# Patient Record
Sex: MALE | Race: WHITE
[De-identification: names, ages, dates, MRNs, and addresses within clinical notes are randomized per-mention and may not be internally consistent; named-entity substitution may affect disease eponyms.]

---

## 2016-06-23 VITALS
DIASTOLIC BLOOD PRESSURE: 84 MMHG | SYSTOLIC BLOOD PRESSURE: 184 MMHG | SYSTOLIC BLOOD PRESSURE: 184 MMHG | SYSTOLIC BLOOD PRESSURE: 184 MMHG | DIASTOLIC BLOOD PRESSURE: 84 MMHG | DIASTOLIC BLOOD PRESSURE: 84 MMHG | DIASTOLIC BLOOD PRESSURE: 84 MMHG | SYSTOLIC BLOOD PRESSURE: 184 MMHG | DIASTOLIC BLOOD PRESSURE: 84 MMHG | SYSTOLIC BLOOD PRESSURE: 184 MMHG | DIASTOLIC BLOOD PRESSURE: 84 MMHG | SYSTOLIC BLOOD PRESSURE: 184 MMHG

## 2017-03-01 ENCOUNTER — HOSPITAL ENCOUNTER (OUTPATIENT)
Dept: HOSPITAL 61 - MRI | Age: 72
Discharge: HOME | End: 2017-03-01
Attending: FAMILY MEDICINE
Payer: MEDICARE

## 2017-03-01 DIAGNOSIS — M50.90: Primary | ICD-10-CM

## 2017-03-01 PROCEDURE — A9585 GADOBUTROL INJECTION: HCPCS

## 2017-03-01 PROCEDURE — 72156 MRI NECK SPINE W/O & W/DYE: CPT

## 2017-03-01 NOTE — RAD
PROCEDURE 

MRI cervical spine without and with contrast. 

 

HISTORY 

Increasing neck pain and right arm weakness for 3 weeks, previous neck 

fusion 

 

TECHNIQUE 

Multiplanar, multi sequential pre and post contrast MR imaging was 

performed of the cervical spine. 

Contrast: 7.5 cc Gadavist 

 

COMPARISON 

August 2, 2016 

 

FINDINGS 

There is some motion degradation. There again has been anterior cervical 

fusion at C3, C4, C5. Exam does not accurately evaluate integrity of 

hardware. Cervical cord caliber is within normal limits without 

significant focal signal abnormality or enhancement. There is no 

significant marrow edema. Cervical vertebral body stature and AP alignment

are preserved. There is no significant abnormality of the cervical 

medullary junction. There is mild cervical levoscoliosis. 

C2-C3: Spinal canal is adequate. There is again shallow posterior central 

protrusion. Neural foramina remain adequate. 

C3-C4: There is again facet hypertrophic change. Central canal is again 

minimally narrowed to 9-10 millimeters. Neural foramina are not 

significantly narrowed. 

C4-C5: Spinal canal is adequate. There are uncovertebral osteophytes. 

There is mild facet hypertrophic change. Neural foramina are not 

significantly narrowed. 

C5-C6: There is again negligible bulge. There is buckling of the 

ligamentum flavum. Central canal is borderline 10 millimeters. There is 

facet degenerative change. There is likely mild narrowing of the right 

neural foramen as seen previously. 

C6-7: There is again buckling of the ligamentum flavum and minimal 

posterior bulge. Central canal is borderline 10 millimeters. Neural 

foramina are not significantly narrowed. 

C7-T1: Spinal canal and neural foramina remain adequate. 

 

IMPRESSION 

Findings are similar comparing with the August 2016 exam. There again has 

been anterior cervical fusion C3, C4, C5. There is no new significant 

cervical spinal stenosis or neural foramina compromise, similar mild 

spinal stenosis C3-4. 

 

Electronically signed by: Armani Calvo MD (Mar 01, 2017 14:20:43)

## 2017-03-23 ENCOUNTER — HOSPITAL ENCOUNTER (OUTPATIENT)
Dept: HOSPITAL 61 - PNCL | Age: 72
Discharge: HOME | End: 2017-03-23
Attending: ANESTHESIOLOGY
Payer: MEDICARE

## 2017-03-23 DIAGNOSIS — M54.12: ICD-10-CM

## 2017-03-23 DIAGNOSIS — F32.9: ICD-10-CM

## 2017-03-23 DIAGNOSIS — M48.02: Primary | ICD-10-CM

## 2017-03-23 DIAGNOSIS — E78.5: ICD-10-CM

## 2017-03-23 DIAGNOSIS — M96.1: ICD-10-CM

## 2017-03-23 DIAGNOSIS — Z90.49: ICD-10-CM

## 2017-03-23 DIAGNOSIS — F41.9: ICD-10-CM

## 2017-03-23 DIAGNOSIS — I25.10: ICD-10-CM

## 2017-03-23 DIAGNOSIS — Z96.641: ICD-10-CM

## 2017-03-23 PROCEDURE — 62323 NJX INTERLAMINAR LMBR/SAC: CPT

## 2017-03-23 PROCEDURE — 62321 NJX INTERLAMINAR CRV/THRC: CPT

## 2017-03-23 NOTE — PAIN
DATE OF SERVICE:  03/23/2017



DIAGNOSES:

1.  Cervical radiculopathy with cervical stenosis and post-cervical laminectomy

syndrome.

2.  Lumbar radiculopathy with post-lumbar laminectomy syndrome.



HISTORY OF PRESENT ILLNESS:  The patient is a 71-year-old male who returns for

followup, last seen in 01/2016.  The patient had undergone surgery in 02/2016,

cervical anterior diskectomy and fusion.  The patient did well after this, but

still has some pain in the base of the neck and into the right shoulder and

upper extremity.  The patient reports it is worse with activity using his upper

extremities, his right arm, also into the anterior chest as well on the right

side.  Aches and hurts with rotational motion of the shoulder and neck.  It is

5-7 on a scale of 10, is a constant aching pain, probably some weakness

sensation in his right arm.  The patient did have MRI scan showing multilevel

degenerative disk disease throughout the cervical spine with severe right neural

foraminal stenosis at C3-C4, moderate bilateral neural foraminal stenosis at

C4-C5 with other less severe degenerative changes in C5-C6 and C6-C7 levels. 

The patient reports no symptoms on the left arm, but significant pain in the

right, it has been awakening her from sleep at night and has difficulty getting

dressed raising his arm up over his head on the right side, but without dropping

any items or overt loss of function in the right upper extremity.



PAST MEDICAL HISTORY:  Significant for hyperlipidemia, coronary artery disease,

depression, anxiety.



PREVIOUS SURGERY:  Include hernia repair, lumbar surgery in 1991,

cholecystectomy, right hip replacement, cardiac stent placement and anterior

cervical diskectomy in 02/2016.



CURRENT MEDICATIONS:  Include gabapentin, omeprazole, multivitamin, Celexa,

carbidopa, metoprolol, daily baby aspirin, vitamin E, simvastatin, tamsulosin,

hydrocodone and levothyroxine.



ALLERGIES:  THE PATIENT HAS NO KNOWN DRUG ALLERGIES.



FAMILY HISTORY:  Significant for hypertension.



SOCIAL HISTORY:  The patient is retired.  He is  and lives with his

spouse, does not smoke, quit drinking in 1991.



REVIEW OF SYSTEMS:  The patient's review of systems is positive for those items

mentioned in history of present illness.  All systems reviewed and otherwise

negative.  It is complete, full and well documented on the patient's chart.



PHYSICAL EXAMINATION:

VITAL SIGNS:  The patient's blood pressure is 115/44, pulse 58, respirations are

18, temperature 98.4 degrees Fahrenheit.  Height is 5 feet 6 inches, weight is

168 pounds.

GENERAL:  The patient is awake, alert, oriented, appropriate, very pleasant

demeanor.

HEENT:  Head shows normocephalic, atraumatic.  Extraocular movements are intact,

symmetrical.  Oral cavity, mucous membranes are moist and pink.  Dentition is

intact.

NECK:  Shows anterior throat supple without palpable lymphadenopathy noted. 

Swallow reflex is symmetrical.

CHEST:  Shows normal on inspection.  Breath sounds are clear to auscultation

bilaterally.

HEART:  Shows S1 and S2 clear.  No murmurs auscultated.

ABDOMEN:  Soft, nontender, nondistended.  No palpable organomegaly.  No rebound

or guarding demonstrated.

BACK:  Shows spine grossly midline.  Cervical paraspinous musculature shows some

mild-to-moderate tenderness in the inferior aspect of the cervical paraspinous

muscles, more on the right than the left, but present bilaterally, but is

symmetrical without evidence of atrophy, hypertrophy, no trigger points, and no

radiation of pain.  The patient shows good rotational motion with some minor

pain reported with extension only, but not with forward flexion, right and left.

 Lateral rotation which is performed fully past 45 degrees, closer to 90

degrees, right and left.  Upper extremities show deep tendon reflexes at 2+ in

the biceps and triceps tendons.  Motor exam is strong with  strength rated

5/5 in bicep and tricep flexion and symmetrical.  Shoulder shrug is strong and

intact without loss of strength and resistance bilaterally as is abduction of

shoulder is 90 degrees without loss of strength or resistance.  Peripheral

pulses are 2+ in radial distribution.  No peripheral edema is noted.  Upper

extremities are warm and dry to touch, equal in color and appearance.



Options were discussed with the patient and the patient's old chart was reviewed

as his current medication regimen and updated and current review of systems

updated as noted.  We will plan on a cervical epidural steroid injection today

with fluoroscopic guidance.  Risks were again discussed including, but not

limited to bleeding, infection, possibility of epidural hematoma, subsequent

neurologic compromise, dural puncture, headaches, spinal cord and/or nerve

damage, side effects of steroid medication and poor results regarding pain

control.  The patient understands and wishes to proceed.  The patient will

return to clinic in approximately 2 weeks for followup, was counseled on return

appointment, activity level and side effects to be aware of.



DIAGNOSES:  Cervical radiculopathy with cervical post-laminectomy syndrome and

cervical spinal stenosis.



PROCEDURE:  Cervical epidural steroid injection in translaminar approach at the

C6-C7 level, using C-arm fluoroscopic guidance under sterile prep and drape

using local anesthetic.  Medication injected is 120 mg Depo-Medrol plus 5 mL

preservative-free normal saline and 2 mL of Isovue contrast.



CONDITION ON DISCHARGE:  Stable.  The patient tolerated procedure well, had no

complications.

 



______________________________

KYLE HOOD MD



DR:  ANU/kostas  JOB#:  [id number] / [id number]

DD:  03/23/2017 08:51  DT:  03/23/2017 10:14

## 2017-04-14 ENCOUNTER — HOSPITAL ENCOUNTER (OUTPATIENT)
Dept: HOSPITAL 61 - PNCL | Age: 72
Discharge: HOME | End: 2017-04-14
Attending: ANESTHESIOLOGY
Payer: MEDICARE

## 2017-04-14 DIAGNOSIS — E05.90: ICD-10-CM

## 2017-04-14 DIAGNOSIS — Z96.641: ICD-10-CM

## 2017-04-14 DIAGNOSIS — F32.9: ICD-10-CM

## 2017-04-14 DIAGNOSIS — Z90.49: ICD-10-CM

## 2017-04-14 DIAGNOSIS — Z87.891: ICD-10-CM

## 2017-04-14 DIAGNOSIS — E78.00: ICD-10-CM

## 2017-04-14 DIAGNOSIS — M48.02: Primary | ICD-10-CM

## 2017-04-14 DIAGNOSIS — M96.1: ICD-10-CM

## 2017-04-14 DIAGNOSIS — J44.9: ICD-10-CM

## 2017-04-14 DIAGNOSIS — M54.12: ICD-10-CM

## 2017-04-14 DIAGNOSIS — Z72.0: ICD-10-CM

## 2017-04-14 PROCEDURE — 62321 NJX INTERLAMINAR CRV/THRC: CPT

## 2017-04-15 NOTE — PAIN
DATE OF SERVICE:  04/14/2017



PROGRESS NOTE FOR PAIN CLINIC



DIAGNOSES:

1.  Cervical radiculopathy with cervical spinal stenosis and cervical

post-laminectomy syndrome.

2.  Lumbar radiculopathy with post-lumbar laminectomy syndrome.



HISTORY OF PRESENT ILLNESS:  The patient is a 71-year-old male who returns for

followup status post cervical epidural steroid injection x 1 on 03/23/2017.  The

patient reports he did very well with this.  Initially, about 50% improvement,

now about 30% and the pain is beginning to return.  The patient reports still in

the base of the neck and shoulders, worse on the right than the left, also

causes some headache as a constant aching pain and it is tight, dull at times,

rated up to 5 on a scale of 10 when it worse and it is currently at 2 on a scale

of 10 today.  The patient reports no new motor or sensory deficits, no new

visual disturbances or other changes with these headaches and reports otherwise,

doing well.  There is still significant pain in the base of the neck, shoulders,

right greater than left as noted.



PHYSICAL EXAMINATION:

VITAL SIGNS:  Today, the patient's blood pressure is 107/66, pulse ____,

respirations 18, temperature is ____ degrees Fahrenheit, height is 5 feet 6

inches, and weight is 169 pounds.

GENERAL:  The patient is awake, alert, oriented, appropriate, very pleasant

demeanor.

HEENT:  Head shows normocephalic, atraumatic.  Extraocular movements are intact,

symmetrical.  Oral cavity, mucous membranes are moist and pink.  Dentition is

intact.

NECK:  Shows anterior throat is supple without palpable lymphadenopathy noted. 

Swallow reflex is symmetrical.

CHEST:  Shows normal on inspection.  Breath sounds are clear to auscultation

bilaterally.

HEART:  Shows S1 and S2 clear.

ABDOMEN:  Soft, nontender, nondistended.  No palpable organomegaly is noted.

BACK:  Shows spine grossly in the midline.  Cervical paraspinous musculature

shows some moderate tenderness with palpation bilaterally in the middle and

lower distribution of the cervical paraspinous musculature, but only diffusely

without radiation or trigger points.  The patient shows some limited extension

of cervical spine, but good forward flexion, right and left lateral rotation is

guarded and slow but past 45 degrees, right and left lateral.

EXTREMITIES:  The patient's upper extremities show deep tendon reflexes 2+ in

the biceps, triceps tendons.  Motor exam is strong with 5/5  strength in the

biceps and triceps flexion and equal.



PLAN:  Options were discussed with the patient and the patient's old chart was

reviewed and his current medication regimen updated.  Current review of systems

updated today as well.  We will proceed with a second cervical epidural steroid

injection today with fluoroscopic guidance.  Risks were again discussed

including, but not limited to bleeding, infection, possibility of epidural

hematoma, subsequent neurological compromise, dural puncture, headaches, spinal

cord and/or nerve damage, side effects of steroid medication and poor results

regarding pain control.  The patient understands and wishes to proceed.  The

patient will return to the clinic in approximately 2 weeks for followup.  He was

counseled to return appointment, activity level and side effects to be aware of.



DIAGNOSES:  Cervical radiculopathy with cervical spinal stenosis and

post-cervical laminectomy syndrome.



PROCEDURE:  Cervical epidural steroid injection using C-arm fluoroscopic

guidance under sterile prep and drape using local anesthetic at the C6-C7 level

in translaminar approach.



MEDICATIONS INJECTED:  A 120 mg of Depo-Medrol plus 5 mL of preservative-free

normal saline and 2 mL of Isovue for contrast.



CONDITION AT DISCHARGE:  Stable.  The patient tolerated procedure well, had no

complications.

 



______________________________

KYLE HOOD MD



DR:  ANU/kostas  JOB#:  [id number] / [id number]

DD:  04/14/2017 12:31  DT:  04/15/2017 06:30

## 2017-05-01 ENCOUNTER — HOSPITAL ENCOUNTER (OUTPATIENT)
Dept: HOSPITAL 61 - PNCL | Age: 72
Discharge: HOME | End: 2017-05-01
Attending: ANESTHESIOLOGY
Payer: MEDICARE

## 2017-05-01 DIAGNOSIS — J44.9: ICD-10-CM

## 2017-05-01 DIAGNOSIS — I25.10: ICD-10-CM

## 2017-05-01 DIAGNOSIS — Z72.89: ICD-10-CM

## 2017-05-01 DIAGNOSIS — M96.1: ICD-10-CM

## 2017-05-01 DIAGNOSIS — K21.9: ICD-10-CM

## 2017-05-01 DIAGNOSIS — Z96.641: ICD-10-CM

## 2017-05-01 DIAGNOSIS — M48.02: Primary | ICD-10-CM

## 2017-05-01 DIAGNOSIS — Z72.0: ICD-10-CM

## 2017-05-01 DIAGNOSIS — M54.12: ICD-10-CM

## 2017-05-01 DIAGNOSIS — E78.00: ICD-10-CM

## 2017-05-01 DIAGNOSIS — F32.9: ICD-10-CM

## 2017-05-01 PROCEDURE — 62321 NJX INTERLAMINAR CRV/THRC: CPT

## 2017-05-01 NOTE — PAIN
DATE OF SERVICE:  05/01/2017



PROGRESS NOTE FOR PAIN CLINIC



DIAGNOSES:

1.  Cervical radiculopathy with cervical spinal stenosis and post-cervical

laminectomy syndrome.

2.  Lumbar radiculopathy with post-lumbar laminectomy syndrome.



HISTORY OF PRESENT ILLNESS:  The patient is a 71-year-old male, who returns for

followup status post cervical epidural steroid injections x 2, last seen

04/14/2017.  The patient did very well and reports near 100% improvement after

the last injection.  The pain in the base of the neck and shoulders, was feeling

much better, increase his activity with greater ease and comfort and sleeping

better at night.  No difficulty with the pain with sleeping.  The patient

reports it is worst as a 4 on a scale of 10, describes as dull achy,

occasionally constant, but much better after the last injection.  The patient

reports no new motor or sensory deficits, no new bowel or bladder incontinence

or other complaints.



PHYSICAL EXAMINATION:

VITAL SIGNS:  The patient's blood pressure 109/72, pulse 63, respirations are

16, temperature 98.0 degrees Fahrenheit.  Height is 5 feet 6 inches, weight is

165 pounds.

GENERAL:  The patient is awake, alert, oriented, appropriate, very pleasant

demeanor.

HEENT:  Head shows normocephalic, atraumatic.  Extraocular movements are intact

and symmetrical.  Oral cavity shows mucous membranes are moist and pink. 

Dentition is intact.

NECK:  Shows anterior throat supple without palpable lymphadenopathy noted. 

Swallow reflex is symmetrical.

CHEST:  Shows normal on inspection.  Breath sounds clear to auscultation

bilaterally.

HEART:  Shows S1 and S2 clear.

ABDOMEN:  Soft, nontender, nondistended.  No palpable organomegaly.  No rebound

or guarding demonstrated.

BACK:  Shows spine grossly midline.  Well-healed surgical scar is noted in the

lumbar distribution.  Cervical lordotic curvature is mildly flattened had on

previous exam with palpation shows some moderate tenderness with palpation

bilaterally in the cervical paraspinous musculature, mainly in the middle and

lower distribution, also superior medial and lateral trapezius some extent, but

without radiation.  The patient shows good rotational motion, mildly limited

secondary to limitation of motion, but not secondary to pain with extension,

also with right and left lateral rotation, but patient ____ 45 degrees

bilaterally.

EXTREMITIES:  The patient's upper extremities show deep tendon reflexes 2+ in

the biceps and triceps tendons.  Motor exam is strong with 5/5  strength,

biceps and triceps flexion.



Options were discussed with the patient.  The patient's old chart was reviewed

as his current medication regimen updated.  Current review of systems updated

today as well.  We will proceed with a third cervical epidural steroid injection

today with fluoroscopic guidance.  Risks were again discussed including, but not

limited to bleeding, infection, possibility of epidural hematoma, subsequent

neurologic compromise, dural puncture, headaches, spinal cord and/or nerve

damage, side effects of steroid medication and poor results regarding pain

control.  The patient understands and wishes to proceed.  The patient will

return to clinic in approximately 2 weeks for followup, was counseled on return

appointment, activity level and side effects to be aware of.

 

DIAGNOSES:  Cervical radiculopathy with cervical spinal stenosis, cervical

post-laminectomy syndrome.



PROCEDURE:  Cervical epidural steroid injection in translaminar approach to the

C6-C7 level using C-arm fluoroscopic guidance under sterile prep and drape using

local anesthetic.



MEDICATIONS INJECTED:  Depo-Medrol 120 mg plus 5 mL of preservative-free normal

saline and 2 mL of Isovue for contrast.



CONDITION AT DISCHARGE:  Stable.  The patient tolerated procedure well, had no

complications.

 



______________________________

KYLE HOOD MD



DR:  ANU/kostas  JOB#:  [id number] / [id number]

DD:  05/01/2017 09:55  DT:  05/01/2017 19:35

## 2017-07-12 ENCOUNTER — HOSPITAL ENCOUNTER (OUTPATIENT)
Dept: HOSPITAL 61 - US | Age: 72
Discharge: HOME | End: 2017-07-12
Attending: INTERNAL MEDICINE
Payer: MEDICARE

## 2017-07-12 DIAGNOSIS — I65.21: ICD-10-CM

## 2017-07-12 DIAGNOSIS — I73.9: Primary | ICD-10-CM

## 2017-07-12 PROCEDURE — 93880 EXTRACRANIAL BILAT STUDY: CPT

## 2017-07-19 NOTE — RAD
--------------- APPROVED REPORT --------------





Patient Location: OUT-PATIENT

Laterality:Bilateral



Indications

pvd



Doppler Spectral Velocity Analysis

Right   Left    

pCCA     69/15 cm/spCCA     98/23 cm/s

mCCA     93/19 cm/smCCA     114/31 cm/s

dCCA     71/18 cm/sdCCA     121/29 cm/s

Bulb     32/19 cm/sBulb     41/ cm/s

ECA      75/ cm/sECA      125/ cm/s

pICA     54/17 cm/spICA     69/19 cm/s

Alvaro     70/26 cm/smICA     99/27 cm/s

dICA     69/28 cm/sdICA     79/26 cm/s

Vert.    56/ cm/sVert.    41/ cm/s

Subcl.   210/ cm/sSubcl.   100/ cm/s

ICA/CCA  0.75ICA/CCA  1.01



Findings

The bilateral carotid arterial systems were assess for stenosis. 



On the right there is mild intimal hyperplasia with mild plaque burden noted throughout the arterial 
tree. The specrtral waveforms and color doppler assessment is consistent with less than 50% stenosis.
 The vertebral velocities are antegrade. The subclavian velocity is suggestive of > 50% stenosis. 



On the left there is mild intimal hyperplasia with mild plaque burden noted throughout the arterial t
ree. The specrtral waveforms and color doppler assessment is consistent with less than 50% stenosis. 
The vertebral velocities are antegrade. The subclavian velocity is within normal limits. 



Critical Notification

Critical Value: No



<Conclusion>

1. No high grade stenosis in the bilateral carotid arterial systems. 

2. Incidental finding of 50% or greater stenosis involving the right subclavian artery.

## 2017-09-06 ENCOUNTER — HOSPITAL ENCOUNTER (OUTPATIENT)
Dept: HOSPITAL 61 - SURGPAT | Age: 72
Discharge: HOME | End: 2017-09-06
Attending: NEUROLOGICAL SURGERY
Payer: MEDICARE

## 2017-09-06 DIAGNOSIS — M54.12: Primary | ICD-10-CM

## 2017-09-06 LAB
ALBUMIN SERPL-MCNC: 3.5 G/DL (ref 3.4–5)
ALBUMIN/GLOB SERPL: 1.2 {RATIO} (ref 1–1.7)
ALP SERPL-CCNC: 80 U/L (ref 46–116)
ALT SERPL-CCNC: 27 U/L (ref 16–63)
ANION GAP SERPL CALC-SCNC: 8 MMOL/L (ref 6–14)
APTT BLD: 29 SEC (ref 24–38)
AST SERPL-CCNC: 22 U/L (ref 15–37)
BASOPHILS # BLD AUTO: 0.1 X10^3/UL (ref 0–0.2)
BASOPHILS NFR BLD: 1 % (ref 0–3)
BILIRUB SERPL-MCNC: 0.3 MG/DL (ref 0.2–1)
BUN SERPL-MCNC: 16 MG/DL (ref 8–26)
BUN/CREAT SERPL: 18 (ref 6–20)
CALCIUM SERPL-MCNC: 8.5 MG/DL (ref 8.5–10.1)
CHLORIDE SERPL-SCNC: 106 MMOL/L (ref 98–107)
CO2 SERPL-SCNC: 27 MMOL/L (ref 21–32)
CREAT SERPL-MCNC: 0.9 MG/DL (ref 0.7–1.3)
EOSINOPHIL NFR BLD: 5 % (ref 0–3)
ERYTHROCYTE [DISTWIDTH] IN BLOOD BY AUTOMATED COUNT: 12.9 % (ref 11.5–14.5)
GFR SERPLBLD BASED ON 1.73 SQ M-ARVRAT: 82.7 ML/MIN
GLOBULIN SER-MCNC: 3 G/DL (ref 2.2–3.8)
GLUCOSE SERPL-MCNC: 126 MG/DL (ref 70–99)
HCT VFR BLD CALC: 43.2 % (ref 39–53)
HGB BLD-MCNC: 14.5 G/DL (ref 13–17.5)
INR PPP: 1 (ref 0.8–1.1)
LYMPHOCYTES # BLD: 1.2 X10^3/UL (ref 1–4.8)
LYMPHOCYTES NFR BLD AUTO: 19 % (ref 24–48)
MCH RBC QN AUTO: 32 PG (ref 25–35)
MCHC RBC AUTO-ENTMCNC: 34 G/DL (ref 31–37)
MCV RBC AUTO: 94 FL (ref 79–100)
MONOCYTES NFR BLD: 12 % (ref 0–9)
NEUTROPHILS NFR BLD AUTO: 63 % (ref 31–73)
PLATELET # BLD AUTO: 162 X10^3/UL (ref 140–400)
POTASSIUM SERPL-SCNC: 3.9 MMOL/L (ref 3.5–5.1)
PROT SERPL-MCNC: 6.5 G/DL (ref 6.4–8.2)
PROTHROMBIN TIME: 13 SEC (ref 11.7–14)
RBC # BLD AUTO: 4.58 X10^6/UL (ref 4.3–5.7)
SODIUM SERPL-SCNC: 141 MMOL/L (ref 136–145)
WBC # BLD AUTO: 6.6 X10^3/UL (ref 4–11)

## 2017-09-06 PROCEDURE — 87641 MR-STAPH DNA AMP PROBE: CPT

## 2017-09-06 PROCEDURE — 85025 COMPLETE CBC W/AUTO DIFF WBC: CPT

## 2017-09-06 PROCEDURE — 36415 COLL VENOUS BLD VENIPUNCTURE: CPT

## 2017-09-06 PROCEDURE — 80053 COMPREHEN METABOLIC PANEL: CPT

## 2017-09-06 PROCEDURE — 85730 THROMBOPLASTIN TIME PARTIAL: CPT

## 2017-09-06 PROCEDURE — 85610 PROTHROMBIN TIME: CPT

## 2017-12-29 ENCOUNTER — HOSPITAL ENCOUNTER (OUTPATIENT)
Dept: HOSPITAL 61 - RAD | Age: 72
Discharge: HOME | End: 2017-12-29
Attending: NEUROLOGICAL SURGERY
Payer: MEDICARE

## 2017-12-29 DIAGNOSIS — Z98.890: ICD-10-CM

## 2017-12-29 DIAGNOSIS — M43.22: Primary | ICD-10-CM

## 2017-12-29 PROCEDURE — 72040 X-RAY EXAM NECK SPINE 2-3 VW: CPT

## 2018-01-25 ENCOUNTER — HOSPITAL ENCOUNTER (OUTPATIENT)
Dept: HOSPITAL 61 - KCIC MRI | Age: 73
Discharge: HOME | End: 2018-01-25
Attending: ORTHOPAEDIC SURGERY
Payer: MEDICARE

## 2018-01-25 DIAGNOSIS — M75.102: Primary | ICD-10-CM

## 2018-01-25 PROCEDURE — 73221 MRI JOINT UPR EXTREM W/O DYE: CPT

## 2018-05-21 ENCOUNTER — HOSPITAL ENCOUNTER (OUTPATIENT)
Dept: HOSPITAL 61 - CCL | Age: 73
Discharge: HOME | End: 2018-05-21
Attending: INTERNAL MEDICINE
Payer: MEDICARE

## 2018-05-21 VITALS
SYSTOLIC BLOOD PRESSURE: 152 MMHG | SYSTOLIC BLOOD PRESSURE: 152 MMHG | DIASTOLIC BLOOD PRESSURE: 67 MMHG | SYSTOLIC BLOOD PRESSURE: 152 MMHG | DIASTOLIC BLOOD PRESSURE: 67 MMHG | DIASTOLIC BLOOD PRESSURE: 67 MMHG | SYSTOLIC BLOOD PRESSURE: 152 MMHG | DIASTOLIC BLOOD PRESSURE: 67 MMHG | DIASTOLIC BLOOD PRESSURE: 67 MMHG | SYSTOLIC BLOOD PRESSURE: 152 MMHG

## 2018-05-21 DIAGNOSIS — Z87.891: ICD-10-CM

## 2018-05-21 DIAGNOSIS — Z90.49: ICD-10-CM

## 2018-05-21 DIAGNOSIS — I36.1: ICD-10-CM

## 2018-05-21 DIAGNOSIS — E03.9: ICD-10-CM

## 2018-05-21 DIAGNOSIS — F03.90: ICD-10-CM

## 2018-05-21 DIAGNOSIS — Z95.5: ICD-10-CM

## 2018-05-21 DIAGNOSIS — Z82.49: ICD-10-CM

## 2018-05-21 DIAGNOSIS — G20: ICD-10-CM

## 2018-05-21 DIAGNOSIS — E78.00: ICD-10-CM

## 2018-05-21 DIAGNOSIS — K21.9: ICD-10-CM

## 2018-05-21 DIAGNOSIS — R55: Primary | ICD-10-CM

## 2018-05-21 DIAGNOSIS — M48.02: ICD-10-CM

## 2018-05-21 DIAGNOSIS — Z86.73: ICD-10-CM

## 2018-05-21 DIAGNOSIS — J44.9: ICD-10-CM

## 2018-05-21 DIAGNOSIS — Z72.89: ICD-10-CM

## 2018-05-21 DIAGNOSIS — I25.10: ICD-10-CM

## 2018-05-21 DIAGNOSIS — M16.11: ICD-10-CM

## 2018-05-21 DIAGNOSIS — G47.33: ICD-10-CM

## 2018-05-21 DIAGNOSIS — M54.12: ICD-10-CM

## 2018-05-21 DIAGNOSIS — N40.0: ICD-10-CM

## 2018-05-21 DIAGNOSIS — Z98.890: ICD-10-CM

## 2018-05-21 DIAGNOSIS — Z96.641: ICD-10-CM

## 2018-05-21 LAB
ANION GAP SERPL CALC-SCNC: 8 MMOL/L (ref 6–14)
BLOOD UREA NITROGEN: 16 MG/DL (ref 8–26)
CALCIUM: 8.8 MG/DL (ref 8.5–10.1)
CHLORIDE: 105 MMOL/L (ref 98–107)
CO2 SERPL-SCNC: 28 MMOL/L (ref 21–32)
CREAT SERPL-MCNC: 0.9 MG/DL (ref 0.7–1.3)
GFR SERPLBLD BASED ON 1.73 SQ M-ARVRAT: 82.7 ML/MIN
GLUCOSE SERPL-MCNC: 93 MG/DL (ref 70–99)
HCG SERPL-ACNC: 7 X10^3/UL (ref 4–11)
HEMATOCRIT: 44.4 % (ref 39–53)
HEMOGLOBIN: 15.4 G/DL (ref 13–17.5)
INR: 1 (ref 0.8–1.1)
MEAN CORPUSCULAR HEMOGLOBIN: 32 PG (ref 25–35)
MEAN CORPUSCULAR HGB CONC: 35 G/DL (ref 31–37)
MEAN CORPUSCULAR VOLUME: 92 FL (ref 79–100)
PARTIAL THROMBOPLASTIN TIME: 28 SEC (ref 24–38)
PLATELET COUNT: 186 X10^3/UL (ref 140–400)
POTASSIUM SERPL-SCNC: 3.9 MMOL/L (ref 3.5–5.1)
PROTHROMBIN TIME PATIENT: 12.3 SEC (ref 11.7–14)
RED BLOOD COUNT: 4.85 X10^6/UL (ref 4.3–5.7)
RED CELL DISTRIBUTION WIDTH: 13.4 % (ref 11.5–14.5)
SODIUM: 141 MMOL/L (ref 136–145)

## 2018-05-21 PROCEDURE — 33282: CPT

## 2018-05-21 PROCEDURE — 85730 THROMBOPLASTIN TIME PARTIAL: CPT

## 2018-05-21 PROCEDURE — 36415 COLL VENOUS BLD VENIPUNCTURE: CPT

## 2018-05-21 PROCEDURE — 85610 PROTHROMBIN TIME: CPT

## 2018-05-21 PROCEDURE — 93306 TTE W/DOPPLER COMPLETE: CPT

## 2018-05-21 PROCEDURE — 85027 COMPLETE CBC AUTOMATED: CPT

## 2018-05-21 PROCEDURE — 80048 BASIC METABOLIC PNL TOTAL CA: CPT

## 2018-05-21 RX ADMIN — LIDOCAINE HYDROCHLORIDE,EPINEPHRINE BITARTRATE 1 ML: 20; .01 INJECTION, SOLUTION INFILTRATION; PERINEURAL at 08:30

## 2018-08-06 ENCOUNTER — HOSPITAL ENCOUNTER (OUTPATIENT)
Dept: HOSPITAL 61 - NM | Age: 73
Discharge: HOME | End: 2018-08-06
Attending: INTERNAL MEDICINE
Payer: MEDICARE

## 2018-08-06 DIAGNOSIS — K21.9: ICD-10-CM

## 2018-08-06 DIAGNOSIS — Z90.49: ICD-10-CM

## 2018-08-06 DIAGNOSIS — J44.9: ICD-10-CM

## 2018-08-06 DIAGNOSIS — Z82.49: ICD-10-CM

## 2018-08-06 DIAGNOSIS — I25.10: Primary | ICD-10-CM

## 2018-08-06 DIAGNOSIS — Z95.5: ICD-10-CM

## 2018-08-06 DIAGNOSIS — I10: ICD-10-CM

## 2018-08-06 DIAGNOSIS — I25.2: ICD-10-CM

## 2018-08-06 DIAGNOSIS — E87.6: ICD-10-CM

## 2018-08-06 DIAGNOSIS — F32.9: ICD-10-CM

## 2018-08-06 DIAGNOSIS — E78.00: ICD-10-CM

## 2018-08-06 DIAGNOSIS — E78.5: ICD-10-CM

## 2018-08-06 DIAGNOSIS — M16.11: ICD-10-CM

## 2018-08-06 DIAGNOSIS — E03.9: ICD-10-CM

## 2018-08-06 LAB
CHOLESTEROL/HDL RATIO: 3.8
CHOLESTEROL: 175 MG/DL (ref 0–200)
HDLC: 46 MG/DL (ref 40–60)
LDLC: 97 MG/DL (ref 0–100)
NON-HDL CHOLESTEROL: 129 MG/DL (ref 0–129)
TRIGLYCERIDES: 161 MG/DL (ref 0–150)
VLDLC: 32 MG/DL (ref 0–40)

## 2018-08-06 PROCEDURE — 96375 TX/PRO/DX INJ NEW DRUG ADDON: CPT

## 2018-08-06 PROCEDURE — 93017 CV STRESS TEST TRACING ONLY: CPT

## 2018-08-06 PROCEDURE — 36415 COLL VENOUS BLD VENIPUNCTURE: CPT

## 2018-08-06 PROCEDURE — 96376 TX/PRO/DX INJ SAME DRUG ADON: CPT

## 2018-08-06 PROCEDURE — 78452 HT MUSCLE IMAGE SPECT MULT: CPT

## 2018-08-06 PROCEDURE — 96374 THER/PROPH/DIAG INJ IV PUSH: CPT

## 2018-08-06 PROCEDURE — 80061 LIPID PANEL: CPT

## 2018-08-06 RX ADMIN — REGADENOSON 1 MG: 0.08 INJECTION, SOLUTION INTRAVENOUS at 10:25

## 2019-02-04 ENCOUNTER — HOSPITAL ENCOUNTER (OUTPATIENT)
Dept: HOSPITAL 61 - RAD | Age: 74
Discharge: HOME | End: 2019-02-04
Attending: INTERNAL MEDICINE
Payer: MEDICARE

## 2019-02-04 DIAGNOSIS — R05: Primary | ICD-10-CM

## 2019-02-04 PROCEDURE — 71046 X-RAY EXAM CHEST 2 VIEWS: CPT

## 2019-02-04 NOTE — RAD
Chest, PA and Lateral:

 

Technique:  PA and lateral views of the chest were obtained.

 

History:  Medical workup.

 

Comparison: 4/27/2018.

 

Findings:

 

 The heart and pulmonary vasculature appear within normal limits. The 

lungs are clear.. The pleural margins are clear.  Heart monitor device 

projects in the left chest wall region.

 

Impression:

 

No acute chest process is seen. 

 

Electronically signed by: Dipak Prado MD (2/4/2019 11:11 AM) [facility]

## 2019-02-06 ENCOUNTER — HOSPITAL ENCOUNTER (OUTPATIENT)
Dept: HOSPITAL 61 - KCIC MRI | Age: 74
Discharge: HOME | End: 2019-02-06
Attending: ORTHOPAEDIC SURGERY
Payer: MEDICARE

## 2019-02-06 DIAGNOSIS — M62.511: ICD-10-CM

## 2019-02-06 DIAGNOSIS — M75.101: Primary | ICD-10-CM

## 2019-02-06 DIAGNOSIS — Z87.891: ICD-10-CM

## 2019-02-06 PROCEDURE — 73221 MRI JOINT UPR EXTREM W/O DYE: CPT

## 2019-02-06 NOTE — [facility]
MR of the right shoulder

 

HISTORY: Right shoulder pain chronically. Decreased range of motion.

 

TECHNIQUE: Routine multiplanar sequences are obtained.

 

FINDINGS:

The acromioclavicular joint is mildly degenerative.

 

Full-thickness tear of the anterior supraspinatus tendon measures 1 cm AP 

diameter. Retraction measures 2 cm of the undersurface fibers. There is 

generalized tendinosis. Undersurface tearing through the more posterior 

tendon. Partial tearing of the subscapularis tendon. Mild-to-moderate 

muscle atrophy. Mild fluid enters subdeltoid bursa.

 

No acute articular cartilage defect. There is limited labral evaluation 

due to some motion degradation but there is evidence of a tear of the 

posterosuperior labrum. Biceps tendinosis.

 

No bone lesion or acute fracture. No aggressive bone destruction.

 

No acute soft tissue abnormality.

 

IMPRESSION:

1. Full-thickness rotator cuff tear of the anterior supraspinatus tendon, 

with more generalized partial rotator cuff tearing.

2. Biceps tendinosis.

3. Small posterosuperior labral tear.

 

Electronically signed by: Jung Teague MD (2/6/2019 1:11 PM) [facility]

## 2019-02-15 ENCOUNTER — HOSPITAL ENCOUNTER (OUTPATIENT)
Dept: HOSPITAL 61 - KCIC CT | Age: 74
Discharge: HOME | End: 2019-02-15
Attending: INTERNAL MEDICINE
Payer: MEDICARE

## 2019-02-15 DIAGNOSIS — I70.0: ICD-10-CM

## 2019-02-15 DIAGNOSIS — Z87.891: ICD-10-CM

## 2019-02-15 DIAGNOSIS — J44.9: ICD-10-CM

## 2019-02-15 DIAGNOSIS — N28.1: ICD-10-CM

## 2019-02-15 DIAGNOSIS — Z90.49: ICD-10-CM

## 2019-02-15 DIAGNOSIS — I11.9: ICD-10-CM

## 2019-02-15 DIAGNOSIS — I25.10: Primary | ICD-10-CM

## 2019-02-15 PROCEDURE — 71250 CT THORAX DX C-: CPT

## 2019-02-15 NOTE — [facility]
PQRS Compliance statement:

 

One or more of the following individualized dose reduction techniques were

utilized for this examination:

1. Automated exposure control.

2. Adjustment of the mA and/or kV according to patient size.

3. Use of iterative reconstruction technique.

 

Indication:Cough, shortness of breath, intermittent wheezing.

 

TECHNIQUE: CT chest none IV contrast with multiplanar reformats.

 

COMPARISON:None

 

FINDINGS:

Heart is normal in size. No pericardial or pleural effusion. Coronary 

artery calcifications. Diffuse atherosclerotic disease of the thoracic 

aorta and upper abdominal aorta. No enlarged axillary or mediastinal 

adenopathy. Evaluation of hilar lymphadenopathy is limited due to lack of 

IV contrast. Most likely dependent pacemaker in the left chest wall. 

Central airways are patent. No emphysema. Focal calcified pleural plaque 

in the posterior left lower lobe. Noncontrast appearance of the visualized

liver, spleen, adrenals, pancreas within normal limits. Simple cyst is 

seen in the left kidney measuring 10.0 x 8.0 cm. Status post 

cholecystectomy. No suspicious bony lesion.

 

IMPRESSION:

1. No acute pulmonary findings. No emphysema.

 

Electronically signed by: Seymour Bolanos DO (2/15/2019 9:37 AM) [id number]

## 2019-06-25 ENCOUNTER — HOSPITAL ENCOUNTER (OUTPATIENT)
Dept: HOSPITAL 61 - MRI | Age: 74
Discharge: HOME | End: 2019-06-25
Attending: FAMILY MEDICINE
Payer: MEDICARE

## 2019-06-25 DIAGNOSIS — M48.02: ICD-10-CM

## 2019-06-25 DIAGNOSIS — Z98.1: ICD-10-CM

## 2019-06-25 DIAGNOSIS — M50.23: ICD-10-CM

## 2019-06-25 DIAGNOSIS — M47.813: Primary | ICD-10-CM

## 2019-06-25 DIAGNOSIS — M43.8X2: ICD-10-CM

## 2019-06-25 PROCEDURE — 72141 MRI NECK SPINE W/O DYE: CPT

## 2019-06-25 NOTE — RAD
MRI of the cervical spine without contrast 6/25/2019

 

CLINICAL HISTORY: Neck pain with right arm weakness.

 

TECHNIQUE: Unenhanced T1-weighted, T2-weighted and inversion recovery 

sagittal and gradient echo and T2-weighted axial images of the cervical 

spine were obtained.

 

FINDINGS: Mild lateral curvature of the cervical spine is seen convex to 

the left. There is straightening of the normal cervical lordosis. The 

patient is post anterior fusion using what appears to be an anterior 

plate, bone screws and bone graft material at C3-4 and C4-5. Degenerative 

signal changes are seen involving the remaining discs of the cervical 

spine. Degenerative signal changes are seen within the marrow surrounding 

these discs. No area of abnormal signal intensity is seen involving the 

cervical spinal cord.

 

At the C2-3 disc space there is a mild generalized disc bulge. 

Superimposed on this disc bulge is a focal central disc protrusion. This 

measures 3 mm in AP diameter. Degenerative changes are seen involving the 

uncovertebral and facet joints bilaterally. These findings efface the 

anterior and posterior CSF without resulting in significant central spinal

canal or neural foraminal stenosis.

 

At the C3-4 level degenerative changes are seen involving the 

uncovertebral and facet joints, right greater than left. These findings do

not result in significant central spinal canal or neural foraminal 

stenosis.

 

At the C4-5 level degenerative changes are seen involving the 

uncovertebral and facet joints, right greater than left. These findings do

not result in significant central spinal canal stenosis. Mild right neural

foraminal stenosis is seen. The left neural foramen is patent.

 

At the C5-6 disc space there is a mild generalized disc bulge. 

Degenerative changes are seen involving the uncovertebral and facet 

joints, right greater than left. These findings do not result in 

significant central spinal canal stenosis. Mild right neural foraminal 

stenosis is seen. The left neural foramen is patent.

 

At the C6-7 disc space there is a mild generalized disc bulge. 

Degenerative changes are seen involving the uncovertebral and facet joints

bilaterally. These findings efface the anterior and posterior CSF 

resulting in mild central spinal canal stenosis without evidence of cord 

impingement. Mild bilateral neural foraminal stenosis is seen.

 

At the C7-T1 disc space there is a mild generalized disc bulge. 

Degenerative changes are seen involving the facet joints bilaterally. 

These findings do not result in significant central spinal canal or neural

foraminal stenosis.

 

IMPRESSION:

1. Post anterior fusion at C3-4 and C4-5.

2. Degenerative changes are seen throughout the cervical spine. These 

findings result in mild central spinal canal stenosis without evidence of 

cord impingement at C6-7. Mild right neural foraminal stenosis is seen at 

C4-5 and C5-6. Mild bilateral neural foraminal stenosis is seen at C6-7.

 

Electronically signed by: Robert Bang MD (6/25/2019 12:01 PM) [facility]

## 2019-08-07 ENCOUNTER — HOSPITAL ENCOUNTER (OUTPATIENT)
Dept: HOSPITAL 61 - US | Age: 74
Discharge: HOME | End: 2019-08-07
Attending: INTERNAL MEDICINE
Payer: MEDICARE

## 2019-08-07 DIAGNOSIS — I10: ICD-10-CM

## 2019-08-07 DIAGNOSIS — I65.23: Primary | ICD-10-CM

## 2019-08-07 DIAGNOSIS — I25.10: ICD-10-CM

## 2019-08-07 DIAGNOSIS — I73.9: ICD-10-CM

## 2019-08-07 PROCEDURE — 93880 EXTRACRANIAL BILAT STUDY: CPT

## 2019-08-07 NOTE — RAD
MR#: [id number]

Account#: [id number]

Accession#: [id number]

Date of Study: 08/07/2019

Ordering Physician: STEPHIE GRADY,

Referring Physician: STEPHIE GRADY,

Tech: Mariely Mai, RDMS, RVT, RTR





--------------- APPROVED REPORT --------------





Patient Location: OUT-PATIENT

Laterality:Bilateral



Risk Factors

Hypertension: 

PVD; Right Leg numbness; RT endarterectomy 2012



Doppler Spectral Velocity Analysis

Right   Left    

pCCA     87/15 cm/spCCA     133/23 cm/s

mCCA     87/16 cm/smCCA     140/30 cm/s

dCCA     88/18 cm/sdCCA     117/17 cm/s

Bulb     41/7 cm/sBulb     65/17 cm/s

ECA      91/10 cm/sECA      158/19 cm/s

pICA     33/10 cm/spICA     43/12 cm/s

Alvaro     76/23 cm/smICA     97/27 cm/s

dICA     64/20 cm/sdICA     64/21 cm/s

Vert.    34/8 cm/sVert.    39/10 cm/s

ICA/CCA  0.86ICA/CCA  0.73



Findings

Grayscale images of the bilateral carotid arteries demonstrate mild to moderate diffuse plaque.



On the right spectral waveforms are grossly within normal limits. No high-grade stenosis is identifie
d. Vertebral velocities are diminished but antegrade. Normal ICA to CCA ratio is noted.



On the left there is likely moderate disease involving the external carotid artery. Probable mild to 
moderate common carotid arterial disease. No high-grade internal carotid artery stenosis is identifie
d.







Critical Notification

Critical Value: No



<Conclusion>

1. Probable moderate left common carotid arterial disease. No significant internal carotid disease bi
laterally

2. Normal antegrade vertebral velocities bilaterally.



Signed by : Stephie Grady, 

Electronically Approved : 08/07/2019 11:15:22

## 2019-10-31 ENCOUNTER — HOSPITAL ENCOUNTER (OUTPATIENT)
Dept: HOSPITAL 61 - CCL | Age: 74
Discharge: HOME | End: 2019-10-31
Attending: INTERNAL MEDICINE
Payer: MEDICARE

## 2019-10-31 VITALS
DIASTOLIC BLOOD PRESSURE: 76 MMHG | SYSTOLIC BLOOD PRESSURE: 159 MMHG | SYSTOLIC BLOOD PRESSURE: 159 MMHG | DIASTOLIC BLOOD PRESSURE: 76 MMHG | DIASTOLIC BLOOD PRESSURE: 76 MMHG | DIASTOLIC BLOOD PRESSURE: 76 MMHG | SYSTOLIC BLOOD PRESSURE: 159 MMHG | SYSTOLIC BLOOD PRESSURE: 159 MMHG

## 2019-10-31 VITALS — DIASTOLIC BLOOD PRESSURE: 74 MMHG | SYSTOLIC BLOOD PRESSURE: 119 MMHG

## 2019-10-31 VITALS — BODY MASS INDEX: 27.32 KG/M2 | WEIGHT: 170 LBS | HEIGHT: 66 IN

## 2019-10-31 DIAGNOSIS — Z45.09: Primary | ICD-10-CM

## 2019-10-31 PROCEDURE — 33284: CPT

## 2019-10-31 PROCEDURE — 33286 RMVL SUBQ CAR RHYTHM MNTR: CPT

## 2019-10-31 NOTE — NUR
Discharge Note:



ANALI VEE [facility]



Discharge instructions and discharge home medications reviewed with Patient and a copy 
given. All questions have been answered and understanding verbalized. 



The following instructions and handouts were given: surgical incision site care



Discontinued lines and drains: no lines or drains to remove.



Patient discharged to Home or [facility] 

Called patient's wife to let her know that he was headed home and he has typed instructions 
regarding his surgical site care.

## 2019-10-31 NOTE — CARD
MR#: [id number]

Account#: [id number]

Accession#: [id number]

Date of Study: 10/31/2019

Ordering Physician: STEPHIE GRADY, 

Referring Physician: STEPHIE GRADY, 

Tech: 





--------------- APPROVED REPORT --------------





PROCEDURE: Loop recorder explant



Indication: End of life. 



Details: 

After appropriate informed consent, the chest was prepped and draped in usual sterile fashion. 



10ml of 2% Lidocaine was administered around the loop recorder site. Using a 11 blade scalpel and richard
nt dissection, the previous loop recorder was removed without difficulty. 



The incision was then closed with steristrips and sterile dressing. 



The patient tolerated the procedure well. 



<Conclusion>

Succesful Biotronik loop recorder explantation. 



Signed by : Stephie Grady, 

Electronically Approved : 10/31/2019 17:24:32

## 2020-06-10 ENCOUNTER — HOSPITAL ENCOUNTER (OUTPATIENT)
Dept: HOSPITAL 61 - LAB | Age: 75
Discharge: HOME | End: 2020-06-10
Attending: NURSE PRACTITIONER
Payer: MEDICARE

## 2020-06-10 DIAGNOSIS — E05.90: ICD-10-CM

## 2020-06-10 DIAGNOSIS — R26.89: ICD-10-CM

## 2020-06-10 DIAGNOSIS — R26.81: ICD-10-CM

## 2020-06-10 DIAGNOSIS — G62.9: Primary | ICD-10-CM

## 2020-06-10 LAB
ALBUMIN SERPL-MCNC: 4 G/DL (ref 3.4–5)
ALBUMIN/GLOB SERPL: 1.3 {RATIO} (ref 1–1.7)
ALP SERPL-CCNC: 102 U/L (ref 46–116)
ALT SERPL-CCNC: 29 U/L (ref 16–63)
ANION GAP SERPL CALC-SCNC: 9 MMOL/L (ref 6–14)
AST SERPL-CCNC: 25 U/L (ref 15–37)
BASOPHILS # BLD AUTO: 0.1 X10^3/UL (ref 0–0.2)
BASOPHILS NFR BLD: 1 % (ref 0–3)
BILIRUB SERPL-MCNC: 0.3 MG/DL (ref 0.2–1)
BUN SERPL-MCNC: 14 MG/DL (ref 8–26)
BUN/CREAT SERPL: 12 (ref 6–20)
CALCIUM SERPL-MCNC: 8.6 MG/DL (ref 8.5–10.1)
CHLORIDE SERPL-SCNC: 104 MMOL/L (ref 98–107)
CO2 SERPL-SCNC: 28 MMOL/L (ref 21–32)
CREAT SERPL-MCNC: 1.2 MG/DL (ref 0.7–1.3)
CRP SERPL-MCNC: 1 MG/L (ref 0–3.3)
EOSINOPHIL NFR BLD: 0.2 X10^3/UL (ref 0–0.7)
EOSINOPHIL NFR BLD: 3 % (ref 0–3)
ERYTHROCYTE [DISTWIDTH] IN BLOOD BY AUTOMATED COUNT: 13 % (ref 11.5–14.5)
GFR SERPLBLD BASED ON 1.73 SQ M-ARVRAT: 59 ML/MIN
GLOBULIN SER-MCNC: 3.2 G/DL (ref 2.2–3.8)
GLUCOSE SERPL-MCNC: 120 MG/DL (ref 70–99)
HCT VFR BLD CALC: 43.4 % (ref 39–53)
HGB BLD-MCNC: 14.8 G/DL (ref 13–17.5)
LYMPHOCYTES # BLD: 1.6 X10^3/UL (ref 1–4.8)
LYMPHOCYTES NFR BLD AUTO: 23 % (ref 24–48)
MCH RBC QN AUTO: 31 PG (ref 25–35)
MCHC RBC AUTO-ENTMCNC: 34 G/DL (ref 31–37)
MCV RBC AUTO: 91 FL (ref 79–100)
MONO #: 0.8 X10^3/UL (ref 0–1.1)
MONOCYTES NFR BLD: 11 % (ref 0–9)
NEUT #: 4.5 X10^3/UL (ref 1.8–7.7)
NEUTROPHILS NFR BLD AUTO: 62 % (ref 31–73)
PLATELET # BLD AUTO: 201 X10^3/UL (ref 140–400)
POTASSIUM SERPL-SCNC: 4.7 MMOL/L (ref 3.5–5.1)
PROT SERPL-MCNC: 7.2 G/DL (ref 6.4–8.2)
RBC # BLD AUTO: 4.77 X10^6/UL (ref 4.3–5.7)
SODIUM SERPL-SCNC: 141 MMOL/L (ref 136–145)
WBC # BLD AUTO: 7.1 X10^3/UL (ref 4–11)

## 2020-06-10 PROCEDURE — 80053 COMPREHEN METABOLIC PANEL: CPT

## 2020-06-10 PROCEDURE — 82607 VITAMIN B-12: CPT

## 2020-06-10 PROCEDURE — 84443 ASSAY THYROID STIM HORMONE: CPT

## 2020-06-10 PROCEDURE — 36415 COLL VENOUS BLD VENIPUNCTURE: CPT

## 2020-06-10 PROCEDURE — 86140 C-REACTIVE PROTEIN: CPT

## 2020-06-10 PROCEDURE — 85025 COMPLETE CBC W/AUTO DIFF WBC: CPT

## 2020-06-17 ENCOUNTER — HOSPITAL ENCOUNTER (OUTPATIENT)
Dept: HOSPITAL 61 - MRI | Age: 75
Discharge: HOME | End: 2020-06-17
Attending: NURSE PRACTITIONER
Payer: MEDICARE

## 2020-06-17 DIAGNOSIS — R26.89: Primary | ICD-10-CM

## 2020-06-17 DIAGNOSIS — R26.81: ICD-10-CM

## 2020-06-17 DIAGNOSIS — H53.2: ICD-10-CM

## 2020-06-17 DIAGNOSIS — Z86.73: ICD-10-CM

## 2020-06-17 PROCEDURE — 70551 MRI BRAIN STEM W/O DYE: CPT

## 2020-06-17 NOTE — RAD
EXAMINATION: Magnetic resonance imaging (MRI) of the brain and brainstem 

without contrast 6/17/2020 9:00 AM

 

HISTORY: Diplopia, unsteady

 

TECHNIQUE: Multiplanar multi-weighted MRI of the brain and brainstem was 

performed without intravenous contrast using the general brain protocol.

 

COMPARISON: MRI brain 4/27/2018

 

FINDINGS:

 

The scalp and calvarium are normal. The superior sagittal sinus 

demonstrates normal venous flow.  The corpus callosum is normal in shape 

and signal intensity. Mild to moderate cerebellar volume loss. The 

pituitary and sella are normal.  The brainstem and craniocervical junction

are unremarkable. Few scattered foci of FLAIR/T2 signal hyperintensity in 

the subcortical white matter are within the range of age-related changes.

 

Diffusion weighted images reveal no hyperintensities to suggest acute 

cerebral infarction. The susceptibility weighted sequences reveal no 

evidence of acute or chronic hemorrhage. Ventricles, sulci and basal 

cisterns are prominent compatible with mild to moderate generalized 

cerebral volume loss. No hydrocephalus.

 

The paranasal sinuses are normal.  The visualized portions of the mastoids

are unremarkable. The orbits appear normal.  Normal flow voids are 

demonstrated in the carotid arteries and basilar artery. Anterior cervical

discectomy and fusion hardware is partially profiled at the C3-C4 

vertebral level.

 

IMPRESSION:

 

 

1. No evidence for acute or subacute ischemia.

2. Mild to moderate cerebellar and cerebral volume loss. Findings may be 

age-related.

 

Electronically signed by: Leonor Berman MD (6/17/2020 10:07 AM) 

[facility]

## 2020-11-12 ENCOUNTER — HOSPITAL ENCOUNTER (OUTPATIENT)
Dept: HOSPITAL 61 - LAB | Age: 75
End: 2020-11-12
Attending: INTERNAL MEDICINE
Payer: MEDICARE

## 2020-11-12 DIAGNOSIS — R06.00: ICD-10-CM

## 2020-11-12 DIAGNOSIS — Z01.812: Primary | ICD-10-CM

## 2020-11-12 DIAGNOSIS — Z20.828: ICD-10-CM

## 2020-11-12 PROCEDURE — U0003 INFECTIOUS AGENT DETECTION BY NUCLEIC ACID (DNA OR RNA); SEVERE ACUTE RESPIRATORY SYNDROME CORONAVIRUS 2 (SARS-COV-2) (CORONAVIRUS DISEASE [COVID-19]), AMPLIFIED PROBE TECHNIQUE, MAKING USE OF HIGH THROUGHPUT TECHNOLOGIES AS DESCRIBED BY CMS-2020-01-R: HCPCS

## 2020-11-16 ENCOUNTER — HOSPITAL ENCOUNTER (OUTPATIENT)
Dept: HOSPITAL 61 - CCL | Age: 75
Discharge: ANOTHER HOSPITAL | End: 2020-11-16
Attending: INTERNAL MEDICINE
Payer: MEDICARE

## 2020-11-16 VITALS — DIASTOLIC BLOOD PRESSURE: 66 MMHG | SYSTOLIC BLOOD PRESSURE: 144 MMHG

## 2020-11-16 VITALS — SYSTOLIC BLOOD PRESSURE: 131 MMHG | DIASTOLIC BLOOD PRESSURE: 62 MMHG

## 2020-11-16 VITALS — SYSTOLIC BLOOD PRESSURE: 122 MMHG | DIASTOLIC BLOOD PRESSURE: 63 MMHG

## 2020-11-16 VITALS — DIASTOLIC BLOOD PRESSURE: 78 MMHG | SYSTOLIC BLOOD PRESSURE: 151 MMHG

## 2020-11-16 VITALS — DIASTOLIC BLOOD PRESSURE: 71 MMHG | SYSTOLIC BLOOD PRESSURE: 137 MMHG

## 2020-11-16 VITALS — HEIGHT: 66 IN | WEIGHT: 170 LBS | BODY MASS INDEX: 27.32 KG/M2

## 2020-11-16 VITALS — SYSTOLIC BLOOD PRESSURE: 132 MMHG | DIASTOLIC BLOOD PRESSURE: 69 MMHG

## 2020-11-16 VITALS — DIASTOLIC BLOOD PRESSURE: 88 MMHG | SYSTOLIC BLOOD PRESSURE: 161 MMHG

## 2020-11-16 VITALS — SYSTOLIC BLOOD PRESSURE: 154 MMHG | DIASTOLIC BLOOD PRESSURE: 70 MMHG

## 2020-11-16 VITALS — DIASTOLIC BLOOD PRESSURE: 71 MMHG | SYSTOLIC BLOOD PRESSURE: 112 MMHG

## 2020-11-16 VITALS — DIASTOLIC BLOOD PRESSURE: 62 MMHG | SYSTOLIC BLOOD PRESSURE: 155 MMHG

## 2020-11-16 VITALS — SYSTOLIC BLOOD PRESSURE: 137 MMHG | DIASTOLIC BLOOD PRESSURE: 68 MMHG

## 2020-11-16 VITALS — SYSTOLIC BLOOD PRESSURE: 154 MMHG | DIASTOLIC BLOOD PRESSURE: 88 MMHG

## 2020-11-16 VITALS — DIASTOLIC BLOOD PRESSURE: 70 MMHG | SYSTOLIC BLOOD PRESSURE: 134 MMHG

## 2020-11-16 VITALS — SYSTOLIC BLOOD PRESSURE: 142 MMHG | DIASTOLIC BLOOD PRESSURE: 51 MMHG

## 2020-11-16 DIAGNOSIS — E78.00: ICD-10-CM

## 2020-11-16 DIAGNOSIS — G47.30: ICD-10-CM

## 2020-11-16 DIAGNOSIS — F32.9: ICD-10-CM

## 2020-11-16 DIAGNOSIS — R06.00: ICD-10-CM

## 2020-11-16 DIAGNOSIS — Z87.891: ICD-10-CM

## 2020-11-16 DIAGNOSIS — Z79.899: ICD-10-CM

## 2020-11-16 DIAGNOSIS — Z98.890: ICD-10-CM

## 2020-11-16 DIAGNOSIS — I10: ICD-10-CM

## 2020-11-16 DIAGNOSIS — Z90.49: ICD-10-CM

## 2020-11-16 DIAGNOSIS — K21.9: ICD-10-CM

## 2020-11-16 DIAGNOSIS — I25.110: Primary | ICD-10-CM

## 2020-11-16 DIAGNOSIS — M19.90: ICD-10-CM

## 2020-11-16 DIAGNOSIS — E66.9: ICD-10-CM

## 2020-11-16 DIAGNOSIS — Z79.82: ICD-10-CM

## 2020-11-16 DIAGNOSIS — Z72.89: ICD-10-CM

## 2020-11-16 DIAGNOSIS — N40.0: ICD-10-CM

## 2020-11-16 LAB
ANION GAP SERPL CALC-SCNC: 10 MMOL/L (ref 6–14)
BUN SERPL-MCNC: 13 MG/DL (ref 8–26)
CALCIUM SERPL-MCNC: 8.8 MG/DL (ref 8.5–10.1)
CHLORIDE SERPL-SCNC: 105 MMOL/L (ref 98–107)
CO2 SERPL-SCNC: 24 MMOL/L (ref 21–32)
CREAT SERPL-MCNC: 1.1 MG/DL (ref 0.7–1.3)
ERYTHROCYTE [DISTWIDTH] IN BLOOD BY AUTOMATED COUNT: 13.1 % (ref 11.5–14.5)
GFR SERPLBLD BASED ON 1.73 SQ M-ARVRAT: 65.1 ML/MIN
GLUCOSE SERPL-MCNC: 106 MG/DL (ref 70–99)
HCT VFR BLD CALC: 44 % (ref 39–53)
HGB BLD-MCNC: 15.3 G/DL (ref 13–17.5)
MCH RBC QN AUTO: 32 PG (ref 25–35)
MCHC RBC AUTO-ENTMCNC: 35 G/DL (ref 31–37)
MCV RBC AUTO: 92 FL (ref 79–100)
PLATELET # BLD AUTO: 218 X10^3/UL (ref 140–400)
POTASSIUM SERPL-SCNC: 4.3 MMOL/L (ref 3.5–5.1)
PROTHROMBIN TIME: 12.6 SEC (ref 11.7–14)
RBC # BLD AUTO: 4.81 X10^6/UL (ref 4.3–5.7)
SODIUM SERPL-SCNC: 139 MMOL/L (ref 136–145)
WBC # BLD AUTO: 6.9 X10^3/UL (ref 4–11)

## 2020-11-16 PROCEDURE — 85610 PROTHROMBIN TIME: CPT

## 2020-11-16 PROCEDURE — 99153 MOD SED SAME PHYS/QHP EA: CPT

## 2020-11-16 PROCEDURE — 76937 US GUIDE VASCULAR ACCESS: CPT

## 2020-11-16 PROCEDURE — 85027 COMPLETE CBC AUTOMATED: CPT

## 2020-11-16 PROCEDURE — 99152 MOD SED SAME PHYS/QHP 5/>YRS: CPT

## 2020-11-16 PROCEDURE — 93460 R&L HRT ART/VENTRICLE ANGIO: CPT

## 2020-11-16 PROCEDURE — 36415 COLL VENOUS BLD VENIPUNCTURE: CPT

## 2020-11-16 PROCEDURE — 80048 BASIC METABOLIC PNL TOTAL CA: CPT

## 2020-11-16 PROCEDURE — C1892 INTRO/SHEATH,FIXED,PEEL-AWAY: HCPCS

## 2020-11-16 PROCEDURE — C1773 RET DEV, INSERTABLE: HCPCS

## 2020-11-16 PROCEDURE — C1769 GUIDE WIRE: HCPCS

## 2020-11-16 NOTE — CARD
MR#: [id number]

Account#: [id number]

Accession#: [id number]

Date of Study: 11/16/2020

Ordering Physician: STEPHIE GRADY, 

Referring Physician: STEPHIE GRADY, 

Tech: YAMILETH CONTRERAS RTR





--------------- APPROVED REPORT --------------





Technologist: YAMILETH CONTRERAS RTR

Nurse: DANIEL AVITIA RN



Procedure(s) performed: MODERATE SEDATION TIME: 40 MINUTES

FLUORO TIME: 3.2 MIN

DOSE: 42.7 GYCM2

CONTRAST: 33CC VISI

LHC, RHC, Coronary angiography



HISTORY

The patient is a 75 year-old male with a history of : coronary artery disease, hypertension, dyslipid
emia.



INDICATION

The indication(s) include : unstable angina , dyspnea.



[facility] Clinical Frailty Scale

[facility] Clinical Frailty Scale: Mildly Frail



Heart Failure

Heart Failure: Yes

If Yes, Newly Diagnosed: No

If Yes, HF Type: Diastolic     

If Yes, NYHA Class: Class II     



PROCEDURE NARRATIVE

Clinical information:

75-year-old male with prior known history of coronary disease with PCI to the LAD and circumflex who 
presents to the catheterization laboratory in the setting of accelerating angina and dyspnea.  Risks 
and benefits were discussed with the patient and he provided appropriate informed consent.



Procedure details:

The right neck and right wrist were prepped and draped in usual sterile fashion.  Under 2% lidocaine 
local anesthesia with ultrasound and fluoroscopic guidance a 5 Namibian sheath was placed in the right 
internal jugular vein.  Next, a 6 Namibian sheath was placed in the right radial artery via the [place]
er technique.  Right heart catheterization was performed with a 5 Namibian PA catheter and pressures an
d saturations were obtained.  Diagnostic coronary angiography was performed with a 6 Namibian TIG ramírez
ter.  Left ventricular end-diastolic pressure and a pullback maneuver were performed with a TIG ramírez
ter.  At case completion the right radial sheath was removed and hemostasis was achieved with a radia
l band.  The right IJ sheath was removed and hemostasis was achieved via manual compression.



Findings:

Hemodynamics:

RA 3 mmHg

RV 20/5

PA 22/10

Wedge: 5

LVEDP 3 mmHg

No gradient on pullback from the LV to the aorta



PA saturation 78%

Arterial saturation 98%

Cardiac output 5.1 L/min, cardiac index 2.75



Aorta: 98/50 mmHg



Coronary angiography:

Left main is a moderate caliber vessel with a diffuse mid to distal body 80% stenosis

LAD is a moderate caliber vessel with a patent proximal stent.  The apex has a 60% stenosis in a smal
l caliber portion of the vessel.

Ramus is a small caliber vessel with an ostial 80% stenosis

D1 is a moderate caliber vessel with a proximal 50% stenosis

Left circumflex is a moderate caliber vessel with mild diffuse irregularities up to 30% and a patent 
proximal stent

RCA is a large caliber dominant vessel with a focal mid 95% stenosis which is heavily calcified.

RPDA and RPL are moderate caliber vessels with mild luminal irregularities



No acute complications.





Conclusion

1.  Normal biventricular filling pressures

2.  No pulmonary hypertension

3.  Normal cardiac output

4.  2 of 2 stents patent in the LAD and circumflex

5.  Severe left main and RCA disease



Recommendations

1.  Although the patient's left main and RCA lesions are amenable to PCI, given adequate targets and 
mild proximal LAD bridging would first recommend recommend evaluation for CABG.  If patient is deemed
 to be high risk due to prior history of carotid vascular disease, neuropathy etc. could always consi
tony high risk PCI.  Patient wishes to be transferred to [facility].



Signed by : Stephie Grady, 

Electronically Approved : 11/16/2020 10:28:13

## 2020-11-16 NOTE — NUR
SS following for discharge planning. SS reviewed pt chart and discussed with pt RN. SS 
received phone contact from Cath lab and notification from Cardiology reporting that pt 
needs transfer to [facility] for CABG. COVID19 negative. SS contacted 
[facility] transfer center, [phone]; fax [phone], and made request for transfer. SS faxed 
clinical to [facility] transfer center as requested. Request for radiology, [phone], to cloud images 
made. Request to Cardiology for reports and CD's made for transfer. Packet and transfer form 
placed on chart. SS will continue to follow for discharge planning.

## 2020-11-16 NOTE — NUR
Consuelo GIL spoke with Toshia GIL at [facility] and gave report on patient, patient 
being transported by [facility] to room 2340. Vital signs stable and patient and his wife are 
awaiting ambulance staff.

## 2020-11-16 NOTE — NUR
STONE here to  patient, VSS, pt alert and oriented x 3, pts valuable were taken home 
by wife. Wife will follow ambulance.  Spoke to Jennifer at [facility] to alert that pt was leaving. 
Wife given info on [facility] visitor policy.  CESARIO GIL

## 2020-11-16 NOTE — NUR
SS following up with discharge planning. Pt accepted at [facility]. 
Accepting physician, Dr. Soler. Report# [phone]. Room#2340. Pt will discharge today and 
go to [facility] via [facility] ambulance. Pt, pt's RN, and pt's spouse 
notified. Packet, transfer form, and ambulance form on the chart.

## 2020-11-16 NOTE — PDOC
MODERATE SEDATION ASSESSMENT


RISKS/ALTERNATIVES


Risks/Alternatives


Risks and alternatives of this type of sedation and procedure discussed with:


RISK/ALTERNATIVES:  Patient





H & P ON CHART


H & P


H & P on chart and reviewed for co-morbid conditions and appropriate labs.


H&P ON CHART:  Yes





PREGNANCY STATUS


PREG STATUS ASSESSED:  N/A





MEDS/ALLERGIES REVIEWED


Meds/Allergies Reviewed


Medications and Allergies including time and route of recently administered 

narcotics and sedatives.


MEDS/ALLERGIES REVIEWED:  Yes





ASA RATING


ASA RATING:  II





AIRWAY ASSESSMENT


Airway Assessment


Airway patency, oral function limitations, presence  of caps, crowns, dentures, 

partials, and ability to extend neck assessed.


AIRWAY ASSESSMENT:  Yes





MALLAMPATI SCORE


MALLAMPATI SCORE:  II





PRE-SEDATION ASSESSMENT


PRE-SEDATION ASSESSMENT:  Yes











STEPHIE GRADY MD       Nov 16, 2020 08:42

## 2021-01-15 ENCOUNTER — HOSPITAL ENCOUNTER (EMERGENCY)
Dept: HOSPITAL 61 - ER | Age: 76
Discharge: HOME | End: 2021-01-15
Payer: MEDICARE

## 2021-01-15 VITALS — HEIGHT: 66 IN | WEIGHT: 163.14 LBS | BODY MASS INDEX: 26.22 KG/M2

## 2021-01-15 VITALS
SYSTOLIC BLOOD PRESSURE: 125 MMHG | DIASTOLIC BLOOD PRESSURE: 60 MMHG | DIASTOLIC BLOOD PRESSURE: 60 MMHG | SYSTOLIC BLOOD PRESSURE: 125 MMHG

## 2021-01-15 DIAGNOSIS — Z86.73: ICD-10-CM

## 2021-01-15 DIAGNOSIS — E78.00: ICD-10-CM

## 2021-01-15 DIAGNOSIS — Z87.891: ICD-10-CM

## 2021-01-15 DIAGNOSIS — R07.89: Primary | ICD-10-CM

## 2021-01-15 DIAGNOSIS — Z95.5: ICD-10-CM

## 2021-01-15 DIAGNOSIS — I11.9: ICD-10-CM

## 2021-01-15 DIAGNOSIS — F03.90: ICD-10-CM

## 2021-01-15 DIAGNOSIS — Z90.49: ICD-10-CM

## 2021-01-15 DIAGNOSIS — I25.10: ICD-10-CM

## 2021-01-15 DIAGNOSIS — J44.9: ICD-10-CM

## 2021-01-15 DIAGNOSIS — Z90.89: ICD-10-CM

## 2021-01-15 DIAGNOSIS — I25.2: ICD-10-CM

## 2021-01-15 LAB
ANION GAP SERPL CALC-SCNC: 12 MMOL/L (ref 6–14)
BASOPHILS # BLD AUTO: 0.1 X10^3/UL (ref 0–0.2)
BASOPHILS NFR BLD: 1 % (ref 0–3)
BUN SERPL-MCNC: 19 MG/DL (ref 8–26)
CALCIUM SERPL-MCNC: 9.3 MG/DL (ref 8.5–10.1)
CHLORIDE SERPL-SCNC: 105 MMOL/L (ref 98–107)
CO2 SERPL-SCNC: 26 MMOL/L (ref 21–32)
CREAT SERPL-MCNC: 1.2 MG/DL (ref 0.7–1.3)
EOSINOPHIL NFR BLD: 0.3 X10^3/UL (ref 0–0.7)
EOSINOPHIL NFR BLD: 3 % (ref 0–3)
ERYTHROCYTE [DISTWIDTH] IN BLOOD BY AUTOMATED COUNT: 13.7 % (ref 11.5–14.5)
GFR SERPLBLD BASED ON 1.73 SQ M-ARVRAT: 58.9 ML/MIN
GLUCOSE SERPL-MCNC: 101 MG/DL (ref 70–99)
HCT VFR BLD CALC: 40.8 % (ref 39–53)
HGB BLD-MCNC: 13.8 G/DL (ref 13–17.5)
LYMPHOCYTES # BLD: 1.4 X10^3/UL (ref 1–4.8)
LYMPHOCYTES NFR BLD AUTO: 17 % (ref 24–48)
MAGNESIUM SERPL-MCNC: 2.2 MG/DL (ref 1.8–2.4)
MCH RBC QN AUTO: 31 PG (ref 25–35)
MCHC RBC AUTO-ENTMCNC: 34 G/DL (ref 31–37)
MCV RBC AUTO: 93 FL (ref 79–100)
MONO #: 0.9 X10^3/UL (ref 0–1.1)
MONOCYTES NFR BLD: 11 % (ref 0–9)
NEUT #: 5.6 X10^3/UL (ref 1.8–7.7)
NEUTROPHILS NFR BLD AUTO: 68 % (ref 31–73)
PLATELET # BLD AUTO: 181 X10^3/UL (ref 140–400)
POTASSIUM SERPL-SCNC: 3.4 MMOL/L (ref 3.5–5.1)
RBC # BLD AUTO: 4.37 X10^6/UL (ref 4.3–5.7)
SODIUM SERPL-SCNC: 143 MMOL/L (ref 136–145)
WBC # BLD AUTO: 8.3 X10^3/UL (ref 4–11)

## 2021-01-15 PROCEDURE — 84484 ASSAY OF TROPONIN QUANT: CPT

## 2021-01-15 PROCEDURE — 93005 ELECTROCARDIOGRAM TRACING: CPT

## 2021-01-15 PROCEDURE — 71045 X-RAY EXAM CHEST 1 VIEW: CPT

## 2021-01-15 PROCEDURE — 36415 COLL VENOUS BLD VENIPUNCTURE: CPT

## 2021-01-15 PROCEDURE — 80048 BASIC METABOLIC PNL TOTAL CA: CPT

## 2021-01-15 PROCEDURE — 83735 ASSAY OF MAGNESIUM: CPT

## 2021-01-15 PROCEDURE — 85025 COMPLETE CBC W/AUTO DIFF WBC: CPT

## 2021-01-15 PROCEDURE — 83880 ASSAY OF NATRIURETIC PEPTIDE: CPT

## 2021-01-15 RX ADMIN — NITROGLYCERIN PRN MG: 0.4 TABLET SUBLINGUAL at 18:57

## 2021-01-15 RX ADMIN — NITROGLYCERIN PRN MG: 0.4 TABLET SUBLINGUAL at 19:23

## 2021-01-15 RX ADMIN — NITROGLYCERIN PRN MG: 0.4 TABLET SUBLINGUAL at 18:43

## 2021-01-15 NOTE — PHYS DOC
Past Medical History


Past Medical History:  CAD, COPD, CVA, Dementia, High Cholesterol, Heart D

isease, Hypertension, MI


Past Surgical History:  Appendectomy, Cholecystectomy, Hip Replacement, Other


Additional Past Surgical Histo:  Back; cardiac stents


Smoking Status:  Former Smoker


Alcohol Use:  None


Drug Use:  None





General Adult


EDM:


Chief Complaint:  CHEST PAIN





HPI:


HPI:





Patient is a 75-year-old male here with wife via POV for chest pain.  Reports 

this is acute on chronic, has known history of CAD and status post CABG 

performed November 2020.  Reports having chest pain that comes and goes ever 

since surgery, has been attending cardiac rehab and last one today.  Reports 

having episodes of deep left breast burning pain that does not radiate and is 5 

out of 10 in severity.  Reports most recent episode started 45 minutes ago 

approximately 1 hour after eating Hawaiian pizza and did not respond when he 

took x2 Aleve prompting him to come in for evaluation.  Nothing known makes 

better or worse.  Patient takes 81 mg aspirin daily, no other blood thinner

s/anticoagulants.  Patient denies fever, syncope, ripping or tearing chest pain,

shortness of breath, cough, abdominal pain, urinary symptoms, no COVID-19 

contact.  Patient and wife confirms his DO NOT RESUSCITATE status





Review of Systems:


Review of Systems:


Fourteen body systems of review of systems have been reviewed. See HPI for 

pertinent positives and negative responses, other wise all other systems are 

negative, non-pertinent or non-contributory





Heart Score:


HEART Score for Chest Pain:  








HEART Score for Chest Pain Response (Comments) Value


 


History Slighlty/Non-Suspicious 0


 


ECG Normal 0


 


Age > 65 2


 


Risk Factors >3 Risk Factors or Hx CAD 2


 


Troponin < Normal Limit 0


 


Total  4








Risk Factors:


Risk Factors:  DM, Current or recent (<one month) smoker, HTN, HLP, family 

history of CAD, obesity.


Risk Scores:


Score 0 - 3:  2.5% MACE over next 6 weeks - Discharge Home


Score 4 - 6:  20.3% MACE over next 6 weeks - Admit for Clinical Observation


Score 7 - 10:  72.7% MACE over next 6 weeks - Early Invasive Strategies





Allergies:


Allergies:





Allergies








Coded Allergies Type Severity Reaction Last Updated Verified


 


  No Known Drug Allergies    9/11/17 No











Physical Exam:


PE:


Constitutional: Well developed, well nourished, no acute distress, non-toxic 

appearance. 


HENT: Normocephalic, atraumatic, bilateral external ears normal, oropharynx 

moist, no oral exudates, nose normal. 


Eyes: PERRLA, EOMI, conjunctiva normal, no discharge.  


Neck: Normal range of motion, no tenderness, supple, no stridor.  


Cardiovascular: Heart rate regular, sinus rhythm, no murmurs rubs or gallops


Lungs & Thorax:  Bilateral breath sounds clear to auscultation 


Abdomen: Bowel sounds normal, soft, no tenderness, no masses, no pulsatile 

masses.  Nonsurgical abdomen, no peritoneal signs


Skin: Warm, dry, no erythema, no rash.  


Back: No tenderness, no CVA tenderness.  


Extremities: No tenderness, no cyanosis, no clubbing, ROM intact, no edema.  


Neurologic: Alert and oriented X 3, grossly normal motor & sensory function, no 

focal deficits noted. 


Psychologic: Affect normal, judgement normal, mood normal.





Current Patient Data:


Labs:





Laboratory Tests








Test


 1/15/21


18:34


 


White Blood Count 8.3 x10^3/uL 


 


Red Blood Count 4.37 x10^6/uL 


 


Hemoglobin 13.8 g/dL 


 


Hematocrit 40.8 % 


 


Mean Corpuscular Volume 93 fL 


 


Mean Corpuscular Hemoglobin 31 pg 


 


Mean Corpuscular Hemoglobin


Concent 34 g/dL 





 


Red Cell Distribution Width 13.7 % 


 


Platelet Count 181 x10^3/uL 


 


Neutrophils (%) (Auto) 68 % 


 


Lymphocytes (%) (Auto) 17 % 


 


Monocytes (%) (Auto) 11 % 


 


Eosinophils (%) (Auto) 3 % 


 


Basophils (%) (Auto) 1 % 


 


Neutrophils # (Auto) 5.6 x10^3/uL 


 


Lymphocytes # (Auto) 1.4 x10^3/uL 


 


Monocytes # (Auto) 0.9 x10^3/uL 


 


Eosinophils # (Auto) 0.3 x10^3/uL 


 


Basophils # (Auto) 0.1 x10^3/uL 


 


Sodium Level 143 mmol/L 


 


Potassium Level 3.4 mmol/L 


 


Chloride Level 105 mmol/L 


 


Carbon Dioxide Level 26 mmol/L 


 


Anion Gap 12 


 


Blood Urea Nitrogen 19 mg/dL 


 


Creatinine 1.2 mg/dL 


 


Estimated GFR


(Cockcroft-Gault) 58.9 





 


Glucose Level 101 mg/dL 


 


Calcium Level 9.3 mg/dL 


 


Magnesium Level 2.2 mg/dL 


 


Troponin I Quantitative < 0.017 ng/mL 


 


NT-Pro-B-Type Natriuretic


Peptide 566 pg/mL 











Current Medications








 Medications


  (Trade)  Dose


 Ordered  Sig/Lyndsey


 Route


 PRN Reason  Start Time


 Stop Time Status Last Admin


Dose Admin


 


 Aspirin


  (Aspirin


 Chewable)  162 mg  1X  ONCE


 PO


   1/15/21 18:30


 1/15/21 18:31 DC 1/15/21 18:42





 


 Nitroglycerin


  (Nitrostat)  0.4 mg  PRN Q5MIN  PRN


 SL


 CP RATING > 1/10  1/15/21 18:30


 1/16/21 18:29  1/15/21 19:23











Vital Signs:





Vital Signs








  Date Time  Temp Pulse Resp B/P (MAP) Pulse Ox O2 Delivery O2 Flow Rate FiO2


 


1/15/21 18:16 96.4 67 16 150/69 (96) 98 Room Air  





 96.4       











EKG:


EKG:


EKG ordered and interpreted by myself at 1818 hrs. as sinus rhythm at 68 bpm, 

prolonged TN at 226, prolonged QRS at 124, prolonged QTC at 486 otherwise 

unremarkable intervals, left axis deviation, T wave inversions noted in leads 

aVR, V1, V2 and V3 without any other obvious ischemic findings, no STEMI


Prior EKG obtained 4/27/2018 obtained and compared to EKG ordered today and 

grossly unchanged





Radiology/Procedures:


Radiology/Procedures:





EXAM: CHEST 1 VIEW 





History: Chest pain 





COMPARISON: 2/4/2019





TECHNIQUE: Single portable radiograph of the chest





FINDINGS:  The cardiac silhouette is unremarkable. The lungs are clear 

bilaterally. The costophrenic sulci are clear and well demarcated.





IMPRESSION:  No radiographic evidence of an acute cardiopulmonary process.


 





Electronically signed by: Dipak Prado MD (1/15/2021 7:05 PM) [id number]





Course & Med Decision Making:


Course & Med Decision Making


Discussed with the patient all findings and diagnostic testing. I discussed most

likely diagnosis of true cardiac chest pain versus atypical chest pain from GERD

or other cause.  Patient much improved after x3 sublingual nitro given in ER.  I

recommended admission for cardiac observation given patient's extensive 

comorbidities and history of cardiac disease but patient declined.  Reports he 

has good access to cardiologist in outpatient setting and will be able to 

discuss ER visit this evening with cardiac nursing staff at cardiac rehab this 

upcoming Monday. Strict return precautions were also discussed at length with 

good understanding by patient. Patient voiced understanding and agreement with 

the plan. Patient knows to come back for repeat evaluation if concerning signs 

or symptoms present prior to outpatient follow-up. Hemodynamically stable, 

ambulatory and well-appearing at time of disposition.





Dragon Disclaimer:


Dragon Disclaimer:


This electronic medical record was generated, in whole or in part, using a voice

recognition dictation system.





Departure


Departure


Impression:  


   Primary Impression:  


   Chest pain, unspecified


Disposition:  01 DC HOME SELF CARE/HOMELESS


Condition:  IMPROVED


Referrals:  


TUCKER QUINN MD (PCP)


Patient Instructions:  Chest Pain (Nonspecific)





Additional Instructions:  


You were seen for chest pain.  Your workup did not show any acute abnormalities 

today, but does not definitively exclude true cardiac chest pain as discussed at

length.  I discussed and recommended role of cardiac observation but you 

declined.  As such, I advise you to call your primary care physician and 

cardiologist after ER departure to review ER visit this evening.  It is likely 

you were suffering from GERD based on history of presenting illness but given 

your comorbidities, you are high risk for actual adverse cardiac events and so 

outpatient follow-up is paramount. You should return to the ED if you develop 

worsening chest pain, shortness of breath, fever, abnormal sweating, leg 

swelling, or any other new or concerning symptoms.  It was a pleasure to take 

care of you and I wish you the best going forward











MUNA HANKS DO                 Arnaldo 15, 2021 18:22

## 2021-01-15 NOTE — RAD
EXAM: CHEST 1 VIEW 



History: Chest pain 



COMPARISON: 2/4/2019



TECHNIQUE: Single portable radiograph of the chest



FINDINGS:  The cardiac silhouette is unremarkable. The lungs are clear bilaterally. The costophrenic 
sulci are clear and well demarcated.



IMPRESSION:  No radiographic evidence of an acute cardiopulmonary process.

 



Electronically signed by: Dipak Prado MD (1/15/2021 7:05 PM) [id number]

## 2021-01-16 NOTE — EKG
[facility]

              [address] [place] [address]

Test Date:    2021-01-15               Test Time:    18:16:03

Pat Name:     ANALI VEE            Department:   

Patient ID:   [id number]           Room:          

Gender:       M                        Technician:   

:          [id number]               Requested By: MUNA HANKS

Order Number: [id number]           Reading MD:     

                                 Measurements

Intervals                              Axis          

Rate:         68                       P:            62

ID:           [id number]                      QRS:          21

QRSD:         124                      T:            48

QT:           452                                    

QTc:          486                                    

                           Interpretive Statements

SINUS RHYTHM

PROLONGED ID INTERVAL

RVH WITH REPOLARIZATION ABNORMALITY

ABNORMAL ECG

RI6.02

No previous ECG available for comparison

## 2021-02-25 ENCOUNTER — HOSPITAL ENCOUNTER (OUTPATIENT)
Dept: HOSPITAL 61 - LAB | Age: 76
End: 2021-02-25
Attending: INTERNAL MEDICINE
Payer: MEDICARE

## 2021-02-25 DIAGNOSIS — I25.10: Primary | ICD-10-CM

## 2021-02-25 LAB
ALBUMIN SERPL-MCNC: 3.7 G/DL (ref 3.4–5)
ALBUMIN/GLOB SERPL: 1.1 {RATIO} (ref 1–1.7)
ALP SERPL-CCNC: 92 U/L (ref 46–116)
ALT SERPL-CCNC: 29 U/L (ref 16–63)
ANION GAP SERPL CALC-SCNC: 11 MMOL/L (ref 6–14)
AST SERPL-CCNC: 21 U/L (ref 15–37)
BASOPHILS # BLD AUTO: 0.1 X10^3/UL (ref 0–0.2)
BASOPHILS NFR BLD: 1 % (ref 0–3)
BILIRUB SERPL-MCNC: 0.5 MG/DL (ref 0.2–1)
BUN SERPL-MCNC: 13 MG/DL (ref 8–26)
BUN/CREAT SERPL: 11 (ref 6–20)
CALCIUM SERPL-MCNC: 8.7 MG/DL (ref 8.5–10.1)
CHLORIDE SERPL-SCNC: 107 MMOL/L (ref 98–107)
CHOLEST SERPL-MCNC: 148 MG/DL (ref 0–200)
CHOLEST/HDLC SERPL: 3 {RATIO}
CO2 SERPL-SCNC: 26 MMOL/L (ref 21–32)
CREAT SERPL-MCNC: 1.2 MG/DL (ref 0.7–1.3)
EOSINOPHIL NFR BLD: 0.2 X10^3/UL (ref 0–0.7)
EOSINOPHIL NFR BLD: 3 % (ref 0–3)
ERYTHROCYTE [DISTWIDTH] IN BLOOD BY AUTOMATED COUNT: 13.4 % (ref 11.5–14.5)
GFR SERPLBLD BASED ON 1.73 SQ M-ARVRAT: 58.9 ML/MIN
GLUCOSE SERPL-MCNC: 92 MG/DL (ref 70–99)
HCT VFR BLD CALC: 43.7 % (ref 39–53)
HDLC SERPL-MCNC: 50 MG/DL (ref 40–60)
HGB BLD-MCNC: 14.4 G/DL (ref 13–17.5)
LDLC: 73 MG/DL (ref 0–100)
LYMPHOCYTES # BLD: 1.3 X10^3/UL (ref 1–4.8)
LYMPHOCYTES NFR BLD AUTO: 21 % (ref 24–48)
MCH RBC QN AUTO: 30 PG (ref 25–35)
MCHC RBC AUTO-ENTMCNC: 33 G/DL (ref 31–37)
MCV RBC AUTO: 91 FL (ref 79–100)
MONO #: 0.6 X10^3/UL (ref 0–1.1)
MONOCYTES NFR BLD: 10 % (ref 0–9)
NEUT #: 4 X10^3/UL (ref 1.8–7.7)
NEUTROPHILS NFR BLD AUTO: 65 % (ref 31–73)
PLATELET # BLD AUTO: 199 X10^3/UL (ref 140–400)
POTASSIUM SERPL-SCNC: 4.5 MMOL/L (ref 3.5–5.1)
PROT SERPL-MCNC: 7.2 G/DL (ref 6.4–8.2)
RBC # BLD AUTO: 4.81 X10^6/UL (ref 4.3–5.7)
SODIUM SERPL-SCNC: 144 MMOL/L (ref 136–145)
TRIGL SERPL-MCNC: 125 MG/DL (ref 0–150)
VLDLC: 25 MG/DL (ref 0–40)
WBC # BLD AUTO: 6.1 X10^3/UL (ref 4–11)

## 2021-02-25 PROCEDURE — 36415 COLL VENOUS BLD VENIPUNCTURE: CPT

## 2021-02-25 PROCEDURE — 83721 ASSAY OF BLOOD LIPOPROTEIN: CPT

## 2021-02-25 PROCEDURE — 85025 COMPLETE CBC W/AUTO DIFF WBC: CPT

## 2021-02-25 PROCEDURE — 80053 COMPREHEN METABOLIC PANEL: CPT

## 2021-02-25 PROCEDURE — 80061 LIPID PANEL: CPT
